# Patient Record
Sex: MALE | Race: WHITE | Employment: FULL TIME | URBAN - METROPOLITAN AREA
[De-identification: names, ages, dates, MRNs, and addresses within clinical notes are randomized per-mention and may not be internally consistent; named-entity substitution may affect disease eponyms.]

---

## 2021-02-08 ENCOUNTER — OFFICE VISIT (OUTPATIENT)
Dept: CARDIOLOGY CLINIC | Age: 51
End: 2021-02-08
Payer: COMMERCIAL

## 2021-02-08 VITALS
TEMPERATURE: 98.2 F | BODY MASS INDEX: 28.88 KG/M2 | HEIGHT: 69 IN | SYSTOLIC BLOOD PRESSURE: 127 MMHG | HEART RATE: 73 BPM | DIASTOLIC BLOOD PRESSURE: 84 MMHG | WEIGHT: 195 LBS

## 2021-02-08 DIAGNOSIS — K21.9 GASTROESOPHAGEAL REFLUX DISEASE, UNSPECIFIED WHETHER ESOPHAGITIS PRESENT: ICD-10-CM

## 2021-02-08 DIAGNOSIS — R06.02 SHORTNESS OF BREATH: Primary | ICD-10-CM

## 2021-02-08 DIAGNOSIS — R07.89 CHEST TIGHTNESS: ICD-10-CM

## 2021-02-08 DIAGNOSIS — E78.5 HYPERLIPIDEMIA, UNSPECIFIED HYPERLIPIDEMIA TYPE: ICD-10-CM

## 2021-02-08 PROCEDURE — 93000 ELECTROCARDIOGRAM COMPLETE: CPT | Performed by: INTERNAL MEDICINE

## 2021-02-08 PROCEDURE — 99204 OFFICE O/P NEW MOD 45 MIN: CPT | Performed by: INTERNAL MEDICINE

## 2021-02-08 RX ORDER — ATORVASTATIN CALCIUM 40 MG/1
40 TABLET, FILM COATED ORAL
COMMUNITY

## 2021-02-08 NOTE — PROGRESS NOTES
HISTORY OF PRESENT ILLNESS  Dakotah Rader is a 53 y.o. male. 2/8/2021  Patient is in today for new patient evaluation is referred here for evaluation of shortness of breath and hyperlipidemia. Patient has been having exertional shortness of breath and chest tightness progressively worse for about 3 months. These are happening when he climbs uphill or stairs. Also happens on lifting things. Denies any resting pain. Denies any orthopnea PND. No peripheral edema. Review of Systems   Constitutional: Negative for chills and fever. HENT: Negative for nosebleeds. Eyes: Negative for blurred vision and double vision. Respiratory: Positive for shortness of breath. Negative for cough, hemoptysis, sputum production and wheezing. Cardiovascular: Positive for chest pain. Negative for palpitations, orthopnea, claudication, leg swelling and PND. Gastrointestinal: Negative for abdominal pain, heartburn, nausea and vomiting. Musculoskeletal: Negative for myalgias. Skin: Negative for rash. Neurological: Negative for dizziness, weakness and headaches. Endo/Heme/Allergies: Does not bruise/bleed easily. Family History   Problem Relation Age of Onset    Hypertension Mother     Cancer Mother     Heart Surgery Father     Heart Attack Father     Heart Attack Paternal Grandmother 43    Heart Attack Paternal Grandfather        Past Medical History:   Diagnosis Date    GERD (gastroesophageal reflux disease)     Hyperlipidemia        Past Surgical History:   Procedure Laterality Date    HX ANKLE FRACTURE TX      HX APPENDECTOMY      HX HERNIA REPAIR         Social History     Tobacco Use    Smoking status: Never Smoker    Smokeless tobacco: Never Used   Substance Use Topics    Alcohol use:  Yes     Alcohol/week: 6.0 standard drinks     Types: 6 Cans of beer per week     Frequency: Never     Binge frequency: Less than monthly     Comment: 6 pack a week       No Known Allergies    Prior to Admission medications    Medication Sig Start Date End Date Taking? Authorizing Provider   atorvastatin (LIPITOR) 40 mg tablet Take 40 mg by mouth nightly. Yes Provider, Historical         Visit Vitals  /84   Pulse 73   Temp 98.2 °F (36.8 °C)   Ht 5' 9\" (1.753 m)   Wt 88.5 kg (195 lb)   BMI 28.80 kg/m²       Physical Exam   Constitutional: He is oriented to person, place, and time. He appears well-developed and well-nourished. HENT:   Head: Normocephalic and atraumatic. Eyes: Conjunctivae are normal.   Neck: Neck supple. No JVD present. No tracheal deviation present. No thyromegaly present. Cardiovascular: Normal rate, regular rhythm and normal heart sounds. Exam reveals no gallop and no friction rub. No murmur heard. Pulmonary/Chest: Breath sounds normal. No respiratory distress. He has no wheezes. He has no rales. He exhibits no tenderness. Abdominal: Soft. There is no abdominal tenderness. Musculoskeletal:         General: No edema. Neurological: He is alert and oriented to person, place, and time. Skin: Skin is warm and dry. Psychiatric: He has a normal mood and affect. Mr. Bere Love has a reminder for a \"due or due soon\" health maintenance. I have asked that he contact his primary care provider for follow-up on this health maintenance. No flowsheet data found. I have personally reviewed patient's records available from hospital and other providers and incorporated findings in patient care. Old ED notes, labs, EKG, chest x-ray  I have personally reviewed patients ekg done at other facility. 2014sinus rhythm, within normal limits    Assessment         ICD-10-CM ICD-9-CM    1. Shortness of breath  R06.02 786.05 AMB POC EKG ROUTINE W/ 12 LEADS, INTER & REP      EXERCISE CARDIAC STRESS TEST      CT HEART W/O CONT WITH CALCIUM      ECHO ADULT COMPLETE    Possible rule out ischemia, valvular disease or cardiomyopathy   2.  Chest tightness  R07.89 786.59 EXERCISE CARDIAC STRESS TEST CT HEART W/O CONT WITH CALCIUM      ECHO ADULT COMPLETE    Exertional episode of chest tightness rule out angina, CAD ischemia   3. Hyperlipidemia, unspecified hyperlipidemia type  E78.5 272.4 EXERCISE CARDIAC STRESS TEST      CT HEART W/O CONT WITH CALCIUM      ECHO ADULT COMPLETE    Recently started on treatment. Continue monitoring   4. Gastroesophageal reflux disease, unspecified whether esophagitis present  K21.9 530.81     Continue current treatment   2021  Seen for chest tightness shortness of breath and multiple family risk factor. Set up for testing and follow-up after that    Medications Discontinued During This Encounter   Medication Reason    aspirin 81 mg chewable tablet Not A Current Medication    pantoprazole (PROTONIX) 40 mg tablet Not A Current Medication       Orders Placed This Encounter    CT HEART W/O CONT WITH CALCIUM     Standing Status:   Future     Standing Expiration Date:   3/8/2022    AMB POC EKG ROUTINE W/ 12 LEADS, INTER & REP     Order Specific Question:   Reason for Exam:     Answer:   sob    ECHO ADULT COMPLETE     Standing Status:   Future     Standing Expiration Date:   2/8/2022     Order Specific Question:   Contrast Enhancement (Bubble Study, Definity, Optison) may be used if criteria listed in established evidence-based protocol has been identified. Answer:   Yes       Follow-up and Dispositions    · Return for F/u after tests.

## 2021-02-09 ENCOUNTER — HOSPITAL ENCOUNTER (OUTPATIENT)
Dept: CT IMAGING | Age: 51
Discharge: HOME | End: 2021-02-09
Attending: INTERNAL MEDICINE
Payer: COMMERCIAL

## 2021-02-09 DIAGNOSIS — E78.5 HYPERLIPIDEMIA, UNSPECIFIED HYPERLIPIDEMIA TYPE: ICD-10-CM

## 2021-02-09 DIAGNOSIS — R07.89 CHEST TIGHTNESS: ICD-10-CM

## 2021-02-09 DIAGNOSIS — R06.02 SHORTNESS OF BREATH: ICD-10-CM

## 2021-02-09 PROCEDURE — 75571 CT HRT W/O DYE W/CA TEST: CPT

## 2021-02-11 ENCOUNTER — TELEPHONE (OUTPATIENT)
Dept: CARDIOLOGY CLINIC | Age: 51
End: 2021-02-11

## 2021-02-11 DIAGNOSIS — E78.5 HYPERLIPIDEMIA, UNSPECIFIED HYPERLIPIDEMIA TYPE: Primary | ICD-10-CM

## 2021-02-11 RX ORDER — ASPIRIN 81 MG/1
81 TABLET ORAL DAILY
Qty: 100 TAB | Refills: 1 | Status: SHIPPED | OUTPATIENT
Start: 2021-02-11

## 2021-02-11 NOTE — TELEPHONE ENCOUNTER
----- Message from Bianca Aguilar MD sent at 2/11/2021  7:53 AM EST -----  Mild coronary calcification. Start aspirin 81 mg a day. A lung nodule is reported.   Patient should have pulmonary appointment to evaluate

## 2021-02-11 NOTE — PROGRESS NOTES
Mild coronary calcification. Start aspirin 81 mg a day. A lung nodule is reported.   Patient should have pulmonary appointment to evaluate

## 2021-02-11 NOTE — TELEPHONE ENCOUNTER
Called and spoke to patient regarding CTA of chest with calcium test per Dr. Hans Gilman, lab reviewed Mild coronary calcification. Start aspirin 81 mg a day. A lung nodule is reported. Patient should have pulmonary appointment to evaluate. He voices understanding and acceptance of this advice and will call back if any further questions or concerns. Patient will discuss more at office visit on tomorrow.

## 2021-02-12 ENCOUNTER — OFFICE VISIT (OUTPATIENT)
Dept: CARDIOLOGY CLINIC | Age: 51
End: 2021-02-12
Payer: COMMERCIAL

## 2021-02-12 VITALS
BODY MASS INDEX: 28.58 KG/M2 | TEMPERATURE: 97.3 F | WEIGHT: 193 LBS | HEIGHT: 69 IN | SYSTOLIC BLOOD PRESSURE: 128 MMHG | DIASTOLIC BLOOD PRESSURE: 90 MMHG | HEART RATE: 78 BPM

## 2021-02-12 DIAGNOSIS — R07.89 CHEST TIGHTNESS: ICD-10-CM

## 2021-02-12 DIAGNOSIS — R06.02 SHORTNESS OF BREATH: ICD-10-CM

## 2021-02-12 DIAGNOSIS — I25.10 CORONARY ARTERY CALCIFICATION: Primary | ICD-10-CM

## 2021-02-12 DIAGNOSIS — E78.5 HYPERLIPIDEMIA, UNSPECIFIED HYPERLIPIDEMIA TYPE: ICD-10-CM

## 2021-02-12 DIAGNOSIS — R91.1 LUNG NODULE: ICD-10-CM

## 2021-02-12 DIAGNOSIS — I25.84 CORONARY ARTERY CALCIFICATION: Primary | ICD-10-CM

## 2021-02-12 PROCEDURE — 99214 OFFICE O/P EST MOD 30 MIN: CPT | Performed by: INTERNAL MEDICINE

## 2021-02-12 NOTE — PROGRESS NOTES
1. Have you been to the ER, urgent care clinic since your last visit? Hospitalized since your last visit? no    2. Have you seen or consulted any other health care providers outside of the [address] [place] [address] since your last visit? Include any pap smears or colon screening.  no

## 2021-02-12 NOTE — PATIENT INSTRUCTIONS
A Healthy Heart: Care Instructions Your Care Instructions Coronary artery disease, also called heart disease, occurs when a substance called plaque builds up in the vessels that supply oxygen-rich blood to your heart muscle. This can narrow the blood vessels and reduce blood flow. A heart attack happens when blood flow is completely blocked. A high-fat diet, smoking, and other factors increase the risk of heart disease. Your doctor has found that you have a chance of having heart disease. You can do lots of things to keep your heart healthy. It may not be easy, but you can change your diet, exercise more, and quit smoking. These steps really work to lower your chance of heart disease. Follow-up care is a key part of your treatment and safety. Be sure to make and go to all appointments, and call your doctor if you are having problems. It's also a good idea to know your test results and keep a list of the medicines you take. How can you care for yourself at home? Diet 
  · Use less salt when you cook and eat. This helps lower your blood pressure. Taste food before salting. Add only a little salt when you think you need it. With time, your taste buds will adjust to less salt.  
  · Eat fewer snack items, fast foods, canned soups, and other high-salt, high-fat, processed foods.  
  · Read food labels and try to avoid saturated and trans fats. They increase your risk of heart disease by raising cholesterol levels.  
  · Limit the amount of solid fat-butter, margarine, and shortening-you eat. Use olive, peanut, or canola oil when you cook. Bake, broil, and steam foods instead of frying them.  
  · Eat a variety of fruit and vegetables every day. Dark green, deep orange, red, or yellow fruits and vegetables are especially good for you. Examples include spinach, carrots, peaches, and berries.   · Foods high in fiber can reduce your cholesterol and provide important vitamins and minerals. High-fiber foods include whole-grain cereals and breads, oatmeal, beans, brown rice, citrus fruits, and apples.  
  · Eat lean proteins. Heart-healthy proteins include seafood, lean meats and poultry, eggs, beans, peas, nuts, seeds, and soy products.  
  · Limit drinks and foods with added sugar. These include candy, desserts, and soda pop. Lifestyle changes 
  · If your doctor recommends it, get more exercise. Walking is a good choice. Bit by bit, increase the amount you walk every day. Try for at least 30 minutes on most days of the week. You also may want to swim, bike, or do other activities.  
  · Do not smoke. If you need help quitting, talk to your doctor about stop-smoking programs and medicines. These can increase your chances of quitting for good. Quitting smoking may be the most important step you can take to protect your heart. It is never too late to quit.  
  · Limit alcohol to 2 drinks a day for men and 1 drink a day for women. Too much alcohol can cause health problems.  
  · Manage other health problems such as diabetes, high blood pressure, and high cholesterol. If you think you may have a problem with alcohol or drug use, talk to your doctor. Medicines 
  · Take your medicines exactly as prescribed. Call your doctor if you think you are having a problem with your medicine.  
  · If your doctor recommends aspirin, take the amount directed each day. Make sure you take aspirin and not another kind of pain reliever, such as acetaminophen (Tylenol). When should you call for help? Call 911 if you have symptoms of a heart attack. These may include: 
  · Chest pain or pressure, or a strange feeling in the chest.  
  · Sweating.  
  · Shortness of breath.  
  · Pain, pressure, or a strange feeling in the back, neck, jaw, or upper belly or in one or both shoulders or arms.   · Lightheadedness or sudden weakness.  
  · A fast or irregular heartbeat. After you call 911, the  may tell you to chew 1 adult-strength or 2 to 4 low-dose aspirin. Wait for an ambulance. Do not try to drive yourself. Watch closely for changes in your health, and be sure to contact your doctor if you have any problems. Where can you learn more? Go to http://www.gray.com/ Enter A968 in the search box to learn more about \"A Healthy Heart: Care Instructions. \" Current as of: December 16, 2019               Content Version: 12.6 © 4592-2591 [employer]. Care instructions adapted under license by [employer] (which disclaims liability or warranty for this information). If you have questions about a medical condition or this instruction, always ask your healthcare professional. [address] any warranty or liability for your use of this information. Heart-Healthy Diet: Care Instructions Your Care Instructions A heart-healthy diet has lots of vegetables, fruits, nuts, beans, and whole grains, and is low in salt. It limits foods that are high in saturated fat, such as meats, cheeses, and fried foods. It may be hard to change your diet, but even small changes can lower your risk of heart attack and heart disease. Follow-up care is a key part of your treatment and safety. Be sure to make and go to all appointments, and call your doctor if you are having problems. It's also a good idea to know your test results and keep a list of the medicines you take. How can you care for yourself at home? Watch your portions · Learn what a serving is. A \"serving\" and a \"portion\" are not always the same thing. Make sure that you are not eating larger portions than are recommended. For example, a serving of pasta is ½ cup. A serving size of meat is 2 to 3 ounces. A 3-ounce serving is about the size of a deck of cards. Measure serving sizes until you are good at [place]" them. Keep in mind that restaurants often serve portions that are 2 or 3 times the size of one serving. · To keep your energy level up and keep you from feeling hungry, eat often but in smaller portions. · Eat only the number of calories you need to stay at a healthy weight. If you need to lose weight, eat fewer calories than your body burns (through exercise and other physical activity). Eat more fruits and vegetables · Eat a variety of fruit and vegetables every day. Dark green, deep orange, red, or yellow fruits and vegetables are especially good for you. Examples include spinach, carrots, peaches, and berries. · Keep carrots, celery, and other veggies handy for snacks. Buy fruit that is in season and store it where you can see it so that you will be tempted to eat it. · Cook dishes that have a lot of veggies in them, such as stir-fries and soups. Limit saturated and trans fat · Read food labels, and try to avoid saturated and trans fats. They increase your risk of heart disease. · Use olive or canola oil when you cook. · Bake, broil, grill, or steam foods instead of frying them. · Choose lean meats instead of high-fat meats such as hot dogs and sausages. Cut off all visible fat when you prepare meat. · Eat fish, skinless poultry, and meat alternatives such as soy products instead of high-fat meats. Soy products, such as tofu, may be especially good for your heart. · Choose low-fat or fat-free milk and dairy products. Eat foods high in fiber · Eat a variety of grain products every day. Include whole-grain foods that have lots of fiber and nutrients. Examples of whole-grain foods include oats, whole wheat bread, and brown rice. · Buy whole-grain breads and cereals, instead of white bread or pastries. Limit salt and sodium · Limit how much salt and sodium you eat to help lower your blood pressure. · Taste food before you salt it. Add only a little salt when you think you need it. With time, your taste buds will adjust to less salt. · Eat fewer snack items, fast foods, and other high-salt, processed foods. Check food labels for the amount of sodium in packaged foods. · Choose low-sodium versions of canned goods (such as soups, vegetables, and beans). Limit sugar · Limit drinks and foods with added sugar. These include candy, desserts, and soda pop. Limit alcohol · Limit alcohol to no more than 2 drinks a day for men and 1 drink a day for women. Too much alcohol can cause health problems. When should you call for help? Watch closely for changes in your health, and be sure to contact your doctor if: 
  · You would like help planning heart-healthy meals. Where can you learn more? Go to http://www.nascimento.com/ Enter V137 in the search box to learn more about \"Heart-Healthy Diet: Care Instructions. \" Current as of: August 22, 2019               Content Version: 12.6 © 3914-3805 [employer]. Care instructions adapted under license by [employer] (which disclaims liability or warranty for this information). If you have questions about a medical condition or this instruction, always ask your healthcare professional. [place] [address] any warranty or liability for your use of this information.

## 2021-02-12 NOTE — PROGRESS NOTES
HISTORY OF PRESENT ILLNESS  Kimberley Boggs is a 53 y.o. male. 2/8/2021  Patient is in today for new patient evaluation is referred here for evaluation of shortness of breath and hyperlipidemia. Patient has been having exertional shortness of breath and chest tightness progressively worse for about 3 months. These are happening when he climbs uphill or stairs. Also happens on lifting things. Denies any resting pain. Denies any orthopnea PND. No peripheral edema. Review of Systems   Constitutional: Negative for chills and fever. HENT: Negative for nosebleeds. Eyes: Negative for blurred vision and double vision. Respiratory: Positive for shortness of breath. Negative for cough, hemoptysis, sputum production and wheezing. Cardiovascular: Positive for chest pain. Negative for palpitations, orthopnea, claudication, leg swelling and PND. Gastrointestinal: Negative for abdominal pain, heartburn, nausea and vomiting. Musculoskeletal: Negative for myalgias. Skin: Negative for rash. Neurological: Negative for dizziness, weakness and headaches. Endo/Heme/Allergies: Does not bruise/bleed easily. Family History   Problem Relation Age of Onset    Hypertension Mother     Cancer Mother     Heart Surgery Father     Heart Attack Father     Heart Attack Paternal Grandmother 43    Heart Attack Paternal Grandfather        Past Medical History:   Diagnosis Date    GERD (gastroesophageal reflux disease)     Hyperlipidemia        Past Surgical History:   Procedure Laterality Date    HX ANKLE FRACTURE TX      HX APPENDECTOMY      HX HERNIA REPAIR         Social History     Tobacco Use    Smoking status: Never Smoker    Smokeless tobacco: Never Used   Substance Use Topics    Alcohol use:  Yes     Alcohol/week: 6.0 standard drinks     Types: 6 Cans of beer per week     Frequency: Never     Binge frequency: Less than monthly     Comment: 6 pack a week       No Known Allergies    Prior to Admission medications    Medication Sig Start Date End Date Taking? Authorizing Provider   aspirin delayed-release 81 mg tablet Take 1 Tab by mouth daily. 2/11/21  Yes Amrik Song MD   atorvastatin (LIPITOR) 40 mg tablet Take 40 mg by mouth nightly. Yes Provider, Historical         Visit Vitals  BP (!) 128/90   Pulse 78   Temp 97.3 °F (36.3 °C)   Ht 5' 9\" (1.753 m)   Wt 87.5 kg (193 lb)   BMI 28.50 kg/m²       Physical Exam   Constitutional: He is oriented to person, place, and time. He appears well-developed and well-nourished. HENT:   Head: Normocephalic and atraumatic. Eyes: Conjunctivae are normal.   Neck: Neck supple. No JVD present. No tracheal deviation present. No thyromegaly present. Cardiovascular: Normal rate, regular rhythm and normal heart sounds. Exam reveals no gallop and no friction rub. No murmur heard. Pulmonary/Chest: Breath sounds normal. No respiratory distress. He has no wheezes. He has no rales. He exhibits no tenderness. Abdominal: Soft. There is no abdominal tenderness. Musculoskeletal:         General: No edema. Neurological: He is alert and oriented to person, place, and time. Skin: Skin is warm and dry. Psychiatric: He has a normal mood and affect. Mr. Alexandru Pitt has a reminder for a \"due or due soon\" health maintenance. I have asked that he contact his primary care provider for follow-up on this health maintenance. No flowsheet data found. I have personally reviewed patient's records available from hospital and other providers and incorporated findings in patient care. Old ED notes, labs, EKG, chest x-ray  I have personally reviewed patients ekg done at other facility. 2014sinus rhythm, within normal limits  RADIOLOGY REPORT: 2021 CTA     There is a solitary 8 mm left lower lobe nodule which is subsolid, but  predominantly groundglass and best seen on image 36.  Based on current Fleischner  Society guidelines, a 3-6 month follow-up chest CT is recommended to assess for  persistence. This could be infectious, inflammatory, or neoplastic. No other  significant incidental noncardiovascular finding. CARDIOLOGY REPORT:   2/2021 CTA     The patient underwent a limited noncontrast CT scan of the chest with cardiac  gating to evaluate for coronary arterial calcification. Using the Agatston  method the coronary calcium score was calculated. There is mild coronary calcium  present in LAD. Coronary calcium score calculated at  49. Interpretation Summary 2/2021    · Baseline ECG: Normal sinus rhythm, non-specific ST-T wave abnormalities. · Low risk Duke treadmill score. · Negative stress test.     Interpretation Summary 2/2021    · LV: Estimated LVEF is 55 - 60%. Normal cavity size, wall thickness, systolic function (ejection fraction normal) and diastolic function. Wall motion: normal.  · RV: Normal right ventricular size and function. · No hemodynamically significant valvular pathology. · PA: Pulmonary arterial systolic pressure is 23 mmHg. Assessment         ICD-10-CM ICD-9-CM    1. Coronary artery calcification  I25.10 414.00     I25.84 414.4     Mild calcification continue statin aspirin   2. Chest tightness  R07.89 786.59     Improved monitor   3. Lung nodule  R91.1 793.11 REFERRAL TO PULMONARY DISEASE    8 mm lung nodule incidental finding on CT scan refer to pulmonary for evaluation and follow-up   4. Hyperlipidemia, unspecified hyperlipidemia type  E78.5 272.4     Continue with high intensity statin treatment monitor lab with PCP again aim LDL at least less than 100   5. Shortness of breath  R06.02 786.05     Stable   2021  Seen for chest tightness shortness of breath and multiple family risk factor. Set up for testing and follow-up after that  2/2021  Mild coronary calcification and lung nodule.   Continue with aspirin and statin chest tightness letter stress test negative echo normal ejection fraction refer to pulmonary  There are no discontinued medications. Orders Placed This Encounter    REFERRAL TO PULMONARY DISEASE     Referral Priority:   Routine     Referral Type:   Consultation     Referral Reason:   Specialty Services Required     Referred to Provider:   Marielena Prince MD       Follow-up and Dispositions    · Return in about 6 months (around 8/12/2021).

## 2021-04-29 ENCOUNTER — OFFICE VISIT (OUTPATIENT)
Dept: PULMONOLOGY | Age: 51
End: 2021-04-29
Payer: COMMERCIAL

## 2021-04-29 VITALS
DIASTOLIC BLOOD PRESSURE: 88 MMHG | HEART RATE: 86 BPM | RESPIRATION RATE: 18 BRPM | SYSTOLIC BLOOD PRESSURE: 119 MMHG | HEIGHT: 69 IN | OXYGEN SATURATION: 94 % | WEIGHT: 192 LBS | TEMPERATURE: 98.5 F | BODY MASS INDEX: 28.44 KG/M2

## 2021-04-29 DIAGNOSIS — R91.1 LUNG NODULE: Primary | ICD-10-CM

## 2021-04-29 PROCEDURE — 99204 OFFICE O/P NEW MOD 45 MIN: CPT | Performed by: INTERNAL MEDICINE

## 2021-04-29 RX ORDER — BISMUTH SUBSALICYLATE 262 MG
1 TABLET,CHEWABLE ORAL DAILY
COMMUNITY

## 2021-04-29 NOTE — PROGRESS NOTES
HISTORY OF PRESENT ILLNESS  Rani Mckoy is a 53 y.o. male referred for a lung nodule. Pt is a non smoker with a family history of cancer. There was incidental note of a lung nodule on CT done for coronary evaluation. Pt denies any respiratory symptoms currently but does have upper respiratory infections occurring usually in the winter. 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 No occupational exposure to inhaled noxious substances. He has cats and dogs and keeps rabbits. Review of Systems   Constitutional: Negative for chills, diaphoresis, fever, malaise/fatigue and weight loss. HENT: Negative for congestion, ear discharge, ear pain, hearing loss, nosebleeds, sinus pain, sore throat and tinnitus. Eyes: Negative for blurred vision, double vision, photophobia, pain, discharge and redness. Respiratory: Negative for cough, hemoptysis, sputum production, shortness of breath, wheezing and stridor. Cardiovascular: Negative for palpitations, orthopnea, claudication, leg swelling and PND. Chest pain: intermittent. Gastrointestinal: Negative for abdominal pain, blood in stool, constipation, diarrhea, heartburn, melena, nausea and vomiting. Genitourinary: Negative for dysuria, flank pain, frequency, hematuria and urgency. Musculoskeletal: Negative for back pain, falls, joint pain, myalgias and neck pain. Skin: Negative for itching and rash. Neurological: Negative for dizziness, tingling, tremors, sensory change, speech change, focal weakness, seizures, loss of consciousness, weakness and headaches. Endo/Heme/Allergies: Negative for environmental allergies and polydipsia. Does not bruise/bleed easily. Psychiatric/Behavioral: Negative for depression, hallucinations, memory loss, substance abuse and suicidal ideas. The patient is not nervous/anxious and does not have insomnia.       Past Medical History:   Diagnosis Date    GERD (gastroesophageal reflux disease)     Hyperlipidemia      Past Surgical History:   Procedure Laterality Date    HX ANKLE FRACTURE TX      HX APPENDECTOMY      HX HERNIA REPAIR       Current Outpatient Medications on File Prior to Visit   Medication Sig Dispense Refill    multivitamin (ONE A DAY) tablet Take 1 Tab by mouth daily.  aspirin delayed-release 81 mg tablet Take 1 Tab by mouth daily. 100 Tab 1    atorvastatin (LIPITOR) 40 mg tablet Take 40 mg by mouth nightly. No current facility-administered medications on file prior to visit. No Known Allergies  Family History   Problem Relation Age of Onset    Hypertension Mother     Cancer Mother         Glioblastoma    Heart Surgery Father     Heart Attack Father     Heart Attack Paternal Grandmother 43    Heart Attack Paternal Grandfather     Cancer Maternal Grandfather      Social History     Socioeconomic History    Marital status:      Spouse name: Not on file    Number of children: Not on file    Years of education: Not on file    Highest education level: Not on file   Occupational History    Not on file   Social Needs    Financial resource strain: Not on file    Food insecurity     Worry: Not on file     Inability: Not on file    Transportation needs     Medical: Not on file     Non-medical: Not on file   Tobacco Use    Smoking status: Never Smoker    Smokeless tobacco: Never Used   Substance and Sexual Activity    Alcohol use:  Yes     Alcohol/week: 6.0 standard drinks     Types: 6 Cans of beer per week     Frequency: Never     Binge frequency: Less than monthly     Comment: 6 pack a week    Drug use: Never    Sexual activity: Not on file   Lifestyle    Physical activity     Days per week: Not on file     Minutes per session: Not on file    Stress: Not on file   Relationships    Social connections     Talks on phone: Not on file     Gets together: Not on file     Attends Mandaeism service: Not on file     Active member of club or organization: Not on file     Attends meetings of clubs or organizations: Not on file     Relationship status: Not on file    Intimate partner violence     Fear of current or ex partner: Not on file     Emotionally abused: Not on file     Physically abused: Not on file     Forced sexual activity: Not on file   Other Topics Concern    Not on file   Social History Narrative    Not on file     Blood pressure 119/88, pulse 86, temperature 98.5 °F (36.9 °C), temperature source Oral, resp. rate 18, height 5' 9\" (1.753 m), weight 87.1 kg (192 lb), SpO2 94 %. Physical Exam  Constitutional:       General: He is not in acute distress. Appearance: Normal appearance. He is not ill-appearing, toxic-appearing or diaphoretic. HENT:      Head: Normocephalic and atraumatic. Right Ear: External ear normal.      Left Ear: External ear normal.      Nose:      Comments: Deferred      Mouth/Throat:      Comments: Deferred   Eyes:      General: No scleral icterus. Right eye: No discharge. Left eye: No discharge. Extraocular Movements: Extraocular movements intact. Conjunctiva/sclera: Conjunctivae normal.      Pupils: Pupils are equal, round, and reactive to light. Neck:      Musculoskeletal: No neck rigidity or muscular tenderness. Vascular: No carotid bruit. Cardiovascular:      Rate and Rhythm: Normal rate and regular rhythm. Pulses: Normal pulses. Heart sounds: Normal heart sounds. No murmur. No gallop. Pulmonary:      Effort: Pulmonary effort is normal. No respiratory distress. Breath sounds: Normal breath sounds. No stridor. No wheezing, rhonchi or rales. Chest:      Chest wall: No tenderness. Abdominal:      General: Abdomen is flat. Palpations: Abdomen is soft. There is no mass. Tenderness: There is no abdominal tenderness. Musculoskeletal:         General: No swelling, tenderness, deformity or signs of injury. Right lower leg: No edema.       Left lower leg: No edema.   Lymphadenopathy:      Cervical: No cervical adenopathy. Skin:     General: Skin is warm and dry. Coloration: Skin is not jaundiced. Findings: No bruising. Comments: Both UE with desquamation associated with sunburn   Neurological:      General: No focal deficit present. Mental Status: He is alert. He is disoriented. Coordination: Coordination normal.   Psychiatric:         Mood and Affect: Mood normal.         Behavior: Behavior normal.         Thought Content: Thought content normal.         Judgment: Judgment normal.       CT Results (most recent):  Status: Final result (Exam End: 2/9/2021 20:22) Provider Status: Reviewed     CT HEART W/O CONT WITH CALCIUM: Result Notes     Ismael Baez MD   2/11/2021  7:53 AM EST      Mild coronary calcification.  Start aspirin 81 mg a day.  A lung nodule is reported.  Patient should have pulmonary appointment to evaluate          Study Result    RADIOLOGY REPORT:      There is a solitary 8 mm left lower lobe nodule which is subsolid, but  predominantly groundglass and best seen on image 36. Based on current Fleischner  Society guidelines, a 3-6 month follow-up chest CT is recommended to assess for  persistence. This could be infectious, inflammatory, or neoplastic. No other  significant incidental noncardiovascular finding.     The final Radiology portion of this exam was provided by Dr. Renee Franks on  2/10/2021 3:46 PM.     The final Cardiology report will follow.  END RADIOLOGY PORTION OF REPORT     CARDIOLOGY REPORT:       The patient underwent a limited noncontrast CT scan of the chest with cardiac  gating to evaluate for coronary arterial calcification. Using the Agatston  method the coronary calcium score was calculated. There is mild coronary calcium  present in LAD.  Coronary calcium score calculated at  49.     IMPRESSION  Coronary calcium score 49.     Score 0               no calcified plaque  Score 1-10          minimal calcified plaque  Score       mild calcified plaque  Score 101-400    moderate calcified plaque  Score > 400        severe calcified plaque        For a more individualized assessment of risk, consider comparing the individual  score with that of other persons of the same age, sex and ethnicity, through the  calcium percentile, available at https://www.scott-young.org/. aspx . In the 2013 ACC/AHA guideline on the assessment of cardiovascular risk, for  those individuals in whom risk assessment is uncertain after using the 10 year  risk for atherosclerotic cardiovascular disease equation, a coronary artery  calcium score > 300 or > 75th percentile for age, sex and ethnicity can be  considered in the decision making, and supports revising risk assessment upward.     The  final Cardiology portion of report was interpreted by Florence Echeverria MD.        The final Cardiology portion of this exam was provided by Dr. Felice Ayala. MD,[facility]. 2/11/2021 7:41 AM     Imaging    CT HEART W/O CONT WITH CALCIUM (Order: [id number]) - 2/9/2021  Result History    CT HEART W/O CONT WITH CALCIUM (Order #[id number]) on 2/11/2021 - Order Result History Report - Result Edited          External Results Report   Signed by    Signed Date/Time  Phone Pager   Carlos A Dixon Yordy 2/11/2021  7:42 AM [phone]  7:[phone]    IR Summary Links    IR Procedure Log   All Reviewers List    Yandy Moses on 2/11/2021 15:10   Leona Damon LPN on 3/02/2943 19:19   Aleha Davies MD on 2/11/2021 07:53          ASSESSMENT and PLAN  Encounter Diagnoses   Name Primary?  Lung nodule Yes     8 mm LLL lung nodule in a non smoker. Etiologies range from inflammatory, infectious and malignant, enma with pt's family history. Discussed possible differentials with pt as well as diagnostic options, which may include PET and biopsy in the future. CT reviewed personally and with pt.   Close CT follow up ordered, will call pt with results as further testing and intervention will depend on results. RTC 3 months.

## 2021-04-29 NOTE — PROGRESS NOTES
Miguelina Montoya presents today for   Chief Complaint   Patient presents with    Lung Nodule     CT heart 2/9/2021. Pt states since starting asprin 81mg he has stopped feeling chest pains. Denies cough and wheezing. Is someone accompanying this pt? No    Is the patient using any DME equipment during OV? No    -DME Company NA    Depression Screening:  3 most recent PHQ Screens 4/29/2021   Little interest or pleasure in doing things Not at all   Feeling down, depressed, irritable, or hopeless Not at all   Total Score PHQ 2 0       Learning Assessment:  No flowsheet data found. Abuse Screening:  No flowsheet data found. Fall Risk  Fall Risk Assessment, last 12 mths 2/12/2021   Able to walk? Yes   Fall in past 12 months? 0   Do you feel unsteady? 0   Are you worried about falling 0         Coordination of Care:  1. Have you been to the ER, urgent care clinic since your last visit? Hospitalized since your last visit? No    2. Have you seen or consulted any other health care providers outside of the [address] [place] [address] since your last visit? Include any pap smears or colon screening.  No.

## 2021-04-30 ENCOUNTER — DOCUMENTATION ONLY (OUTPATIENT)
Dept: PULMONOLOGY | Age: 51
End: 2021-04-30

## 2021-05-18 ENCOUNTER — HOSPITAL ENCOUNTER (OUTPATIENT)
Dept: CT IMAGING | Age: 51
Discharge: HOME | End: 2021-05-18
Attending: INTERNAL MEDICINE
Payer: COMMERCIAL

## 2021-05-18 DIAGNOSIS — R91.1 LUNG NODULE: ICD-10-CM

## 2021-05-18 PROCEDURE — 71250 CT THORAX DX C-: CPT

## 2021-06-03 ENCOUNTER — TELEPHONE (OUTPATIENT)
Dept: PULMONOLOGY | Age: 51
End: 2021-06-03

## 2021-06-03 DIAGNOSIS — R91.1 LUNG NODULE: Primary | ICD-10-CM

## 2021-08-16 ENCOUNTER — OFFICE VISIT (OUTPATIENT)
Dept: CARDIOLOGY CLINIC | Age: 51
End: 2021-08-16
Payer: COMMERCIAL

## 2021-08-16 VITALS
WEIGHT: 187 LBS | BODY MASS INDEX: 27.7 KG/M2 | DIASTOLIC BLOOD PRESSURE: 78 MMHG | OXYGEN SATURATION: 96 % | SYSTOLIC BLOOD PRESSURE: 114 MMHG | HEART RATE: 75 BPM | HEIGHT: 69 IN

## 2021-08-16 DIAGNOSIS — I25.10 CORONARY ARTERY CALCIFICATION: Primary | ICD-10-CM

## 2021-08-16 DIAGNOSIS — R07.89 CHEST TIGHTNESS: ICD-10-CM

## 2021-08-16 DIAGNOSIS — R91.1 LUNG NODULE: ICD-10-CM

## 2021-08-16 DIAGNOSIS — I25.84 CORONARY ARTERY CALCIFICATION: Primary | ICD-10-CM

## 2021-08-16 DIAGNOSIS — E78.5 HYPERLIPIDEMIA, UNSPECIFIED HYPERLIPIDEMIA TYPE: ICD-10-CM

## 2021-08-16 PROCEDURE — 99214 OFFICE O/P EST MOD 30 MIN: CPT | Performed by: INTERNAL MEDICINE

## 2021-08-16 NOTE — PROGRESS NOTES
HISTORY OF PRESENT ILLNESS  Aristides Shah is a 49 y.o. male. 2/8/2021  Patient is in today for new patient evaluation is referred here for evaluation of shortness of breath and hyperlipidemia. Patient has been having exertional shortness of breath and chest tightness progressively worse for about 3 months. These are happening when he climbs uphill or stairs. Also happens on lifting things. Denies any resting pain. Denies any orthopnea PND. No peripheral edema. Review of Systems   Constitutional: Negative for chills and fever. HENT: Negative for nosebleeds. Eyes: Negative for blurred vision and double vision. Respiratory: Positive for shortness of breath. Negative for cough, hemoptysis, sputum production and wheezing. Cardiovascular: Positive for chest pain. Negative for palpitations, orthopnea, claudication, leg swelling and PND. Gastrointestinal: Negative for abdominal pain, heartburn, nausea and vomiting. Musculoskeletal: Negative for myalgias. Skin: Negative for rash. Neurological: Negative for dizziness, weakness and headaches. Endo/Heme/Allergies: Does not bruise/bleed easily. Family History   Problem Relation Age of Onset    Hypertension Mother     Cancer Mother         Glioblastoma    Heart Surgery Father     Heart Attack Father     Heart Attack Paternal Grandmother 43    Heart Attack Paternal Grandfather     Cancer Maternal Grandfather        Past Medical History:   Diagnosis Date    GERD (gastroesophageal reflux disease)     Hyperlipidemia        Past Surgical History:   Procedure Laterality Date    HX ANKLE FRACTURE TX      HX APPENDECTOMY      HX HERNIA REPAIR         Social History     Tobacco Use    Smoking status: Never Smoker    Smokeless tobacco: Never Used   Substance Use Topics    Alcohol use:  Yes     Alcohol/week: 6.0 standard drinks     Types: 6 Cans of beer per week     Comment: 6 pack a week       No Known Allergies    Prior to Admission medications    Medication Sig Start Date End Date Taking? Authorizing Provider   multivitamin (ONE A DAY) tablet Take 1 Tab by mouth daily. Yes Provider, Historical   aspirin delayed-release 81 mg tablet Take 1 Tab by mouth daily. 2/11/21  Yes Oscar Jeffery MD   atorvastatin (LIPITOR) 40 mg tablet Take 40 mg by mouth nightly. Yes Provider, Historical         Visit Vitals  /78 (BP 1 Location: Left upper arm, BP Patient Position: Sitting, BP Cuff Size: Adult)   Pulse 75   Ht 5' 9\" (1.753 m)   Wt 84.8 kg (187 lb)   SpO2 96%   BMI 27.62 kg/m²       Physical Exam  Constitutional:       Appearance: He is well-developed. HENT:      Head: Normocephalic and atraumatic. Eyes:      Conjunctiva/sclera: Conjunctivae normal.   Neck:      Thyroid: No thyromegaly. Vascular: No JVD. Trachea: No tracheal deviation. Cardiovascular:      Rate and Rhythm: Normal rate and regular rhythm. Heart sounds: Normal heart sounds. No murmur heard. No friction rub. No gallop. Pulmonary:      Effort: No respiratory distress. Breath sounds: Normal breath sounds. No wheezing or rales. Chest:      Chest wall: No tenderness. Abdominal:      Palpations: Abdomen is soft. Tenderness: There is no abdominal tenderness. Musculoskeletal:      Cervical back: Neck supple. Skin:     General: Skin is warm and dry. Neurological:      Mental Status: He is alert and oriented to person, place, and time. Mr. Jimmie Greco has a reminder for a \"due or due soon\" health maintenance. I have asked that he contact his primary care provider for follow-up on this health maintenance. No flowsheet data found. I have personally reviewed patient's records available from hospital and other providers and incorporated findings in patient care. Old ED notes, labs, EKG, chest x-ray  I have personally reviewed patients ekg done at other facility.   2014sinus rhythm, within normal limits  RADIOLOGY REPORT: 2021 CTA There is a solitary 8 mm left lower lobe nodule which is subsolid, but  predominantly groundglass and best seen on image 36. Based on current Fleischner  Society guidelines, a 3-6 month follow-up chest CT is recommended to assess for  persistence. This could be infectious, inflammatory, or neoplastic. No other  significant incidental noncardiovascular finding. CARDIOLOGY REPORT:   2/2021 CTA     The patient underwent a limited noncontrast CT scan of the chest with cardiac  gating to evaluate for coronary arterial calcification. Using the Agatston  method the coronary calcium score was calculated. There is mild coronary calcium  present in LAD. Coronary calcium score calculated at  49. Interpretation Summary 2/2021    · Baseline ECG: Normal sinus rhythm, non-specific ST-T wave abnormalities. · Low risk Duke treadmill score. · Negative stress test.     Interpretation Summary 2/2021    · LV: Estimated LVEF is 55 - 60%. Normal cavity size, wall thickness, systolic function (ejection fraction normal) and diastolic function. Wall motion: normal.  · RV: Normal right ventricular size and function. · No hemodynamically significant valvular pathology. · PA: Pulmonary arterial systolic pressure is 23 mmHg. Ref Range & Units 1 mo ago 6/2021   Cholesterol 110 - 200 mg/dL 149        Triglyceride 40 - 149 mg/dL 96        HDL >=40 mg/dL 45        Cholesterol/HDL 0.0 - 5.0  3.3        Non-HDL Cholesterol <130 mg/dL 104        LDL CALCULATION 50 - 99 mg/dL 84        VLDL CALCULATION 8 - 30 mg/dL 19        LDL/HDL Ratio   1.9        Assessment         ICD-10-CM ICD-9-CM    1. Coronary artery calcification  I25.10 414.00     I25.84 414.4     Stable symptoms continue monitoring continue medical therapy   2. Chest tightness  R07.89 786.59     Stable symptom continue medical management negative stress test in past   3. Hyperlipidemia, unspecified hyperlipidemia type  E78.5 272.4     Continue treatment lab with PCP   4. Lung nodule  R91.1 793.11     Pulmonary evaluation noted. CT from 5/2021 result noted   2021  Seen for chest tightness shortness of breath and multiple family risk factor. Set up for testing and follow-up after that  2/2021  Mild coronary calcification and lung nodule. Continue with aspirin and statin chest tightness letter stress test negative echo normal ejection fraction refer to pulmonary  8/2021  Cardiac status stable. Chest tightness better. Continue aspirin and statin. Lung nodule followed by pulmonary. Last CT scan shows a stable nodule  There are no discontinued medications. No orders of the defined types were placed in this encounter. Follow-up and Dispositions    · Return in about 6 months (around 2/16/2022).

## 2021-08-16 NOTE — PROGRESS NOTES
1. Have you been to the ER, urgent care clinic since your last visit? Hospitalized since your last visit? No    2. Have you seen or consulted any other health care providers outside of the [address] [place] [address] since your last visit? Include any pap smears or colon screening.  Yes Where: Pulmonary NP PCP Routine

## 2022-02-28 ENCOUNTER — OFFICE VISIT (OUTPATIENT)
Dept: CARDIOLOGY CLINIC | Age: 52
End: 2022-02-28
Payer: COMMERCIAL

## 2022-02-28 VITALS
HEART RATE: 76 BPM | SYSTOLIC BLOOD PRESSURE: 119 MMHG | WEIGHT: 165 LBS | BODY MASS INDEX: 24.44 KG/M2 | DIASTOLIC BLOOD PRESSURE: 64 MMHG | HEIGHT: 69 IN

## 2022-02-28 DIAGNOSIS — I25.84 CORONARY ARTERY CALCIFICATION: Primary | ICD-10-CM

## 2022-02-28 DIAGNOSIS — E78.5 HYPERLIPIDEMIA, UNSPECIFIED HYPERLIPIDEMIA TYPE: ICD-10-CM

## 2022-02-28 DIAGNOSIS — R07.89 CHEST TIGHTNESS: ICD-10-CM

## 2022-02-28 DIAGNOSIS — I25.10 CORONARY ARTERY CALCIFICATION: Primary | ICD-10-CM

## 2022-02-28 PROCEDURE — 99213 OFFICE O/P EST LOW 20 MIN: CPT | Performed by: INTERNAL MEDICINE

## 2022-02-28 NOTE — PROGRESS NOTES
HISTORY OF PRESENT ILLNESS  Gina Cortez is a 49 y.o. male. 2/8/2021  Patient is in today for new patient evaluation is referred here for evaluation of shortness of breath and hyperlipidemia. Patient has been having exertional shortness of breath and chest tightness progressively worse for about 3 months. These are happening when he climbs uphill or stairs. Also happens on lifting things. Denies any resting pain. Denies any orthopnea PND. No peripheral edema. Follow-up  Associated symptoms include chest pain and shortness of breath. Pertinent negatives include no abdominal pain and no headaches. Review of Systems   Constitutional: Negative for chills and fever. HENT: Negative for nosebleeds. Eyes: Negative for blurred vision and double vision. Respiratory: Positive for shortness of breath. Negative for cough, hemoptysis, sputum production and wheezing. Cardiovascular: Positive for chest pain. Negative for palpitations, orthopnea, claudication, leg swelling and PND. Gastrointestinal: Negative for abdominal pain, heartburn, nausea and vomiting. Musculoskeletal: Negative for myalgias. Skin: Negative for rash. Neurological: Negative for dizziness, weakness and headaches. Endo/Heme/Allergies: Does not bruise/bleed easily. Family History   Problem Relation Age of Onset    Hypertension Mother     Cancer Mother         Glioblastoma    Heart Surgery Father     Heart Attack Father     Heart Attack Paternal Grandmother 43    Heart Attack Paternal Grandfather     Cancer Maternal Grandfather        Past Medical History:   Diagnosis Date    GERD (gastroesophageal reflux disease)     Hyperlipidemia        Past Surgical History:   Procedure Laterality Date    HX ANKLE FRACTURE TX      HX APPENDECTOMY      HX HERNIA REPAIR         Social History     Tobacco Use    Smoking status: Never Smoker    Smokeless tobacco: Never Used   Substance Use Topics    Alcohol use:  Yes Alcohol/week: 6.0 standard drinks     Types: 6 Cans of beer per week     Comment: 6 pack a week       No Known Allergies    Prior to Admission medications    Medication Sig Start Date End Date Taking? Authorizing Provider   multivitamin (ONE A DAY) tablet Take 1 Tab by mouth daily. Yes Provider, Historical   aspirin delayed-release 81 mg tablet Take 1 Tab by mouth daily. 2/11/21  Yes Radha Mcbride MD   atorvastatin (LIPITOR) 40 mg tablet Take 40 mg by mouth nightly. Yes Provider, Historical         Visit Vitals  /64   Pulse 76   Ht 5' 9\" (1.753 m)   Wt 74.8 kg (165 lb)   BMI 24.37 kg/m²       Physical Exam  Constitutional:       Appearance: He is well-developed. HENT:      Head: Normocephalic and atraumatic. Eyes:      Conjunctiva/sclera: Conjunctivae normal.   Neck:      Thyroid: No thyromegaly. Vascular: No JVD. Trachea: No tracheal deviation. Cardiovascular:      Rate and Rhythm: Normal rate and regular rhythm. Heart sounds: Normal heart sounds. No murmur heard. No friction rub. No gallop. Pulmonary:      Effort: No respiratory distress. Breath sounds: Normal breath sounds. No wheezing or rales. Chest:      Chest wall: No tenderness. Abdominal:      Palpations: Abdomen is soft. Tenderness: There is no abdominal tenderness. Musculoskeletal:      Cervical back: Neck supple. Skin:     General: Skin is warm and dry. Neurological:      Mental Status: He is alert and oriented to person, place, and time. Mr. Lori Recinos has a reminder for a \"due or due soon\" health maintenance. I have asked that he contact his primary care provider for follow-up on this health maintenance. No flowsheet data found. I have personally reviewed patient's records available from hospital and other providers and incorporated findings in patient care. Old ED notes, labs, EKG, chest x-ray  I have personally reviewed patients ekg done at other facility.   2014sinus rhythm, within normal limits  RADIOLOGY REPORT: 2021 CTA     There is a solitary 8 mm left lower lobe nodule which is subsolid, but  predominantly groundglass and best seen on image 36. Based on current Fleischner  Society guidelines, a 3-6 month follow-up chest CT is recommended to assess for  persistence. This could be infectious, inflammatory, or neoplastic. No other  significant incidental noncardiovascular finding. CARDIOLOGY REPORT:   2/2021 CTA     The patient underwent a limited noncontrast CT scan of the chest with cardiac  gating to evaluate for coronary arterial calcification. Using the Agatston  method the coronary calcium score was calculated. There is mild coronary calcium  present in LAD. Coronary calcium score calculated at  49. Interpretation Summary 2/2021    · Baseline ECG: Normal sinus rhythm, non-specific ST-T wave abnormalities. · Low risk Duke treadmill score. · Negative stress test.     Interpretation Summary 2/2021    · LV: Estimated LVEF is 55 - 60%. Normal cavity size, wall thickness, systolic function (ejection fraction normal) and diastolic function. Wall motion: normal.  · RV: Normal right ventricular size and function. · No hemodynamically significant valvular pathology. · PA: Pulmonary arterial systolic pressure is 23 mmHg. Ref Range & Units 1 mo ago 6/2021   Cholesterol 110 - 200 mg/dL 149        Triglyceride 40 - 149 mg/dL 96        HDL >=40 mg/dL 45        Cholesterol/HDL 0.0 - 5.0  3.3        Non-HDL Cholesterol <130 mg/dL 104        LDL CALCULATION 50 - 99 mg/dL 84        VLDL CALCULATION 8 - 30 mg/dL 19        LDL/HDL Ratio   1.9        Assessment         ICD-10-CM ICD-9-CM    1. Coronary artery calcification  I25.10 414.00     I25.84 414.4     Stable continue therapy   2. Hyperlipidemia, unspecified hyperlipidemia type  E78.5 272.4     Continue statin therapy PCP   3.  Chest tightness  R07.89 786.59     Significantly improved symptom continue treatment   2021  Seen for chest tightness shortness of breath and multiple family risk factor. Set up for testing and follow-up after that  2/2021  Mild coronary calcification and lung nodule. Continue with aspirin and statin chest tightness letter stress test negative echo normal ejection fraction refer to pulmonary  8/2021  Cardiac status stable. Chest tightness better. Continue aspirin and statin. Lung nodule followed by pulmonary. Last CT scan shows a stable nodule  2/2021  Stable cardiac status. Symptoms significantly better continue aspirin and statin lab with PCP he has significant weight loss continue with current modification  There are no discontinued medications. No orders of the defined types were placed in this encounter. Follow-up and Dispositions    · Return in about 1 year (around 2/28/2023).

## 2022-02-28 NOTE — PROGRESS NOTES
1. Have you been to the ER, urgent care clinic since your last visit? Hospitalized since your last visit?     no  2. Have you seen or consulted any other health care providers outside of the [address] [place] [address] since your last visit? Include any pap smears or colon screening.       Yes Where: Dr. Reta Rivera

## 2022-03-18 PROBLEM — I25.10 CORONARY ARTERY CALCIFICATION: Status: ACTIVE | Noted: 2021-02-12

## 2022-03-18 PROBLEM — I25.84 CORONARY ARTERY CALCIFICATION: Status: ACTIVE | Noted: 2021-02-12

## 2023-08-29 ENCOUNTER — OFFICE VISIT (OUTPATIENT)
Age: 53
End: 2023-08-29
Payer: COMMERCIAL

## 2023-08-29 VITALS
BODY MASS INDEX: 26.81 KG/M2 | WEIGHT: 181 LBS | SYSTOLIC BLOOD PRESSURE: 123 MMHG | HEIGHT: 69 IN | OXYGEN SATURATION: 95 % | HEART RATE: 78 BPM | DIASTOLIC BLOOD PRESSURE: 85 MMHG

## 2023-08-29 DIAGNOSIS — I25.84 CORONARY ATHEROSCLEROSIS DUE TO CALCIFIED CORONARY LESION (CODE): Primary | ICD-10-CM

## 2023-08-29 DIAGNOSIS — E78.5 HYPERLIPIDEMIA, UNSPECIFIED HYPERLIPIDEMIA TYPE: ICD-10-CM

## 2023-08-29 DIAGNOSIS — Z82.49 FAMILY HISTORY OF CORONARY ARTERIOSCLEROSIS: ICD-10-CM

## 2023-08-29 PROCEDURE — 99213 OFFICE O/P EST LOW 20 MIN: CPT | Performed by: INTERNAL MEDICINE

## 2023-08-29 RX ORDER — CEPHALEXIN 500 MG/1
CAPSULE ORAL
COMMUNITY
Start: 2023-08-28 | End: 2023-09-07

## 2023-08-29 RX ORDER — TADALAFIL 5 MG/1
TABLET ORAL DAILY
COMMUNITY
Start: 2023-07-25

## 2023-08-29 NOTE — PROGRESS NOTES
1. Have you been to the ER, urgent care clinic since your last visit? Hospitalized since your last visit?     no      2. Where do you normally have your labs drawn? 3. Do you need any refills today?    no
Continue with risk factor modification          There are no discontinued medications. Orders Placed This Encounter    tadalafil (CIALIS) 5 MG tablet     Sig: daily    cephALEXin (KEFLEX) 500 MG capsule     Sig: as directed Orally every 6 hrs for 10 days       Follow-up and Dispositions    Return in about 1 year (around 8/29/2024). 2021  Seen for chest tightness shortness of breath and multiple family risk factor. Set up for testing and follow-up after that  2/2021  Mild coronary calcification and lung nodule. Continue with aspirin and statin chest tightness letter stress test negative echo normal ejection fraction refer to pulmonary  8/2021  Cardiac status stable. Chest tightness better. Continue aspirin and statin. Lung nodule followed by pulmonary. Last CT scan shows a stable nodule  2/2021  Stable cardiac status. Symptoms significantly better continue aspirin and statin lab with PCP he has significant weight loss continue with current modification  8/2023  Stable state cardiac status. Continue aspirin and statin lab done with PCP. Continue dietary modification.   Follow-up with pulmonary for lung nodule

## 2024-08-21 ENCOUNTER — OFFICE VISIT (OUTPATIENT)
Age: 54
End: 2024-08-21
Payer: COMMERCIAL

## 2024-08-21 VITALS
HEART RATE: 72 BPM | OXYGEN SATURATION: 96 % | DIASTOLIC BLOOD PRESSURE: 81 MMHG | BODY MASS INDEX: 28.94 KG/M2 | SYSTOLIC BLOOD PRESSURE: 116 MMHG | WEIGHT: 196 LBS

## 2024-08-21 DIAGNOSIS — I25.84 CORONARY ATHEROSCLEROSIS DUE TO CALCIFIED CORONARY LESION (CODE): Primary | ICD-10-CM

## 2024-08-21 DIAGNOSIS — E78.2 MIXED HYPERLIPIDEMIA: ICD-10-CM

## 2024-08-21 DIAGNOSIS — Z82.49 FH: PREMATURE CORONARY HEART DISEASE: ICD-10-CM

## 2024-08-21 PROCEDURE — 99213 OFFICE O/P EST LOW 20 MIN: CPT | Performed by: INTERNAL MEDICINE

## 2024-08-21 PROCEDURE — 93000 ELECTROCARDIOGRAM COMPLETE: CPT | Performed by: INTERNAL MEDICINE

## 2024-08-21 RX ORDER — ATORVASTATIN CALCIUM 80 MG/1
80 TABLET, FILM COATED ORAL DAILY
Qty: 90 TABLET | Refills: 3 | Status: SHIPPED | OUTPATIENT
Start: 2024-08-21

## 2024-08-21 ASSESSMENT — ENCOUNTER SYMPTOMS
EYES NEGATIVE: 1
GASTROINTESTINAL NEGATIVE: 1
RESPIRATORY NEGATIVE: 1

## 2024-08-21 NOTE — PROGRESS NOTES
1. Have you been to the ER, urgent care clinic since your last visit?  Hospitalized since your last visit? NO    2.  Where do you normally have your labs drawn?  [facility] P[facility]    3. Do you need any refills today?   NO    4. Which local pharmacy do you use (enter pharmacy)?   [facility]    5. Which mail order pharmacy do you use (enter pharmacy)?   NO     6. Are you here for surgical clearance and if so who will be doing your     procedure/surgery (care team)?   NO

## 2024-08-21 NOTE — PROGRESS NOTES
Angel Lee is a 54 y.o. year old male.    Follow-up of coronary calcification, hyperlipidemia, family history of CAD      2/8/2021  Patient is in today for new patient evaluation is referred here for evaluation of shortness of breath and hyperlipidemia.  Patient has been having exertional shortness of breath and chest tightness progressively worse for about 3 months.  These are happening when he climbs uphill or stairs.  Also happens on lifting things.  Denies any resting pain.  Denies any orthopnea PND.  No peripheral edema.  8/21/2024 No significant chest pain, shortness of breath, edema, dizziness, palpitations or loss of consciousness.  Gets indigestion which improves with Pepcid.             Review of Systems   Constitutional: Negative.    HENT: Negative.     Eyes: Negative.    Respiratory: Negative.     Cardiovascular: Negative.    Gastrointestinal: Negative.    Endocrine: Negative.    Genitourinary: Negative.    Musculoskeletal: Negative.    Neurological: Negative.    Psychiatric/Behavioral: Negative.     All other systems reviewed and are negative.        Physical Exam  Vitals and nursing note reviewed.   Constitutional:       Appearance: Normal appearance.   HENT:      Head: Normocephalic and atraumatic.      Nose: Nose normal.   Eyes:      Conjunctiva/sclera: Conjunctivae normal.   Cardiovascular:      Rate and Rhythm: Normal rate and regular rhythm.      Pulses: Normal pulses.      Heart sounds: Normal heart sounds.   Pulmonary:      Effort: Pulmonary effort is normal.      Breath sounds: Normal breath sounds.   Abdominal:      General: Bowel sounds are normal.      Palpations: Abdomen is soft.   Musculoskeletal:         General: Normal range of motion.      Right lower leg: No edema.      Left lower leg: No edema.   Skin:     General: Skin is warm and dry.   Neurological:      General: No focal deficit present.      Mental Status: He is alert and oriented to person, place, and time.   Psychiatric:

## 2025-09-03 ENCOUNTER — OFFICE VISIT (OUTPATIENT)
Age: 55
End: 2025-09-03
Payer: COMMERCIAL

## 2025-09-03 VITALS
WEIGHT: 196 LBS | OXYGEN SATURATION: 97 % | SYSTOLIC BLOOD PRESSURE: 128 MMHG | BODY MASS INDEX: 29.03 KG/M2 | DIASTOLIC BLOOD PRESSURE: 83 MMHG | HEIGHT: 69 IN | HEART RATE: 69 BPM

## 2025-09-03 DIAGNOSIS — R06.02 SOB (SHORTNESS OF BREATH) ON EXERTION: ICD-10-CM

## 2025-09-03 DIAGNOSIS — I25.84 CORONARY ATHEROSCLEROSIS DUE TO CALCIFIED CORONARY LESION (CODE): ICD-10-CM

## 2025-09-03 DIAGNOSIS — R07.9 CHEST PAIN, UNSPECIFIED TYPE: ICD-10-CM

## 2025-09-03 DIAGNOSIS — Z82.49 FH: PREMATURE CORONARY HEART DISEASE: ICD-10-CM

## 2025-09-03 DIAGNOSIS — E78.2 MIXED HYPERLIPIDEMIA: Primary | ICD-10-CM

## 2025-09-03 PROCEDURE — 99214 OFFICE O/P EST MOD 30 MIN: CPT | Performed by: INTERNAL MEDICINE

## 2025-09-03 PROCEDURE — 93000 ELECTROCARDIOGRAM COMPLETE: CPT | Performed by: INTERNAL MEDICINE

## 2025-09-03 RX ORDER — OMEPRAZOLE 20 MG/1
20 CAPSULE, DELAYED RELEASE ORAL DAILY
COMMUNITY
Start: 2025-08-29

## 2025-09-03 RX ORDER — DOXYCYCLINE 100 MG/1
100 CAPSULE ORAL 2 TIMES DAILY
COMMUNITY
Start: 2025-08-29

## 2025-09-03 ASSESSMENT — ENCOUNTER SYMPTOMS
GASTROINTESTINAL NEGATIVE: 1
RESPIRATORY NEGATIVE: 1
EYES NEGATIVE: 1